# Patient Record
Sex: MALE | Race: OTHER | Employment: UNEMPLOYED | URBAN - METROPOLITAN AREA
[De-identification: names, ages, dates, MRNs, and addresses within clinical notes are randomized per-mention and may not be internally consistent; named-entity substitution may affect disease eponyms.]

---

## 2020-06-02 ENCOUNTER — EDUCATION (OUTPATIENT)
Dept: HEMATOLOGY/ONCOLOGY | Facility: CLINIC | Age: 35
End: 2020-06-02

## 2020-06-02 DIAGNOSIS — Z52.001 DONOR OF STEM CELL: Primary | ICD-10-CM

## 2020-06-16 ENCOUNTER — TELEPHONE (OUTPATIENT)
Dept: HEMATOLOGY/ONCOLOGY | Facility: CLINIC | Age: 35
End: 2020-06-16

## 2020-06-16 NOTE — TELEPHONE ENCOUNTER
----- Message from Kiki Jewell sent at 6/16/2020  3:50 PM CDT -----  Regarding: Lab Order  Contact: PT  PT called - requesting to speak with nurse  PT is confused on where he can go to get lab work done back in his home town in [place]     PT said he's been to 5 different lab facilities and they won't do the certain type of lab work that is required for him to get done.   PT is needing help to find which facilities out there he can get the lab work done.     Callback: [phone]

## 2020-06-17 ENCOUNTER — TELEPHONE (OUTPATIENT)
Dept: HEMATOLOGY/ONCOLOGY | Facility: CLINIC | Age: 35
End: 2020-06-17

## 2020-06-25 PROCEDURE — 81373 HLA I TYPING 1 LOCUS LR: CPT | Mod: 91,PO

## 2020-06-25 PROCEDURE — 81376 HLA II TYPING 1 LOCUS LR: CPT | Mod: 91,PO

## 2020-06-25 PROCEDURE — 81376 HLA II TYPING 1 LOCUS LR: CPT | Mod: 59,PO

## 2020-06-25 PROCEDURE — 81373 HLA I TYPING 1 LOCUS LR: CPT | Mod: PO

## 2020-06-25 PROCEDURE — 81376 HLA II TYPING 1 LOCUS LR: CPT | Mod: PO

## 2020-06-26 ENCOUNTER — LAB VISIT (OUTPATIENT)
Dept: LAB | Facility: HOSPITAL | Age: 35
End: 2020-06-26
Payer: OTHER GOVERNMENT

## 2020-06-26 DIAGNOSIS — Z52.001 DONOR OF STEM CELL: ICD-10-CM

## 2020-06-29 LAB — HLATY INTERPRETATION: NORMAL

## 2020-07-06 LAB
HLA DQA1 1: NORMAL
HLA DQA1 2: NORMAL
HLA DRB4 1: NORMAL
HLA-A 1 SERO. EQUIV: 32
HLA-A 1: NORMAL
HLA-A 2 SERO. EQUIV: 68
HLA-A 2: NORMAL
HLA-B 1 SERO. EQUIV: 65
HLA-B 1: NORMAL
HLA-B 2 SERO. EQUIV: 71
HLA-B 2: NORMAL
HLA-BW 1 SERO. EQUIV: 6
HLA-BW 2 SERO. EQUIV: NORMAL
HLA-C 1: NORMAL
HLA-C 2: NORMAL
HLA-CW 1 SERO. EQUIV: 10
HLA-CW 2 SERO. EQUIV: 8
HLA-DPA1 1: NORMAL
HLA-DPA1 2: NORMAL
HLA-DPB1 1: NORMAL
HLA-DPB1 2: NORMAL
HLA-DQ 1 SERO. EQUIV: 5
HLA-DQ 2 SERO. EQUIV: NORMAL
HLA-DQB1 1: NORMAL
HLA-DQB1 2: NORMAL
HLA-DRB1 1 SERO. EQUIV: 1
HLA-DRB1 1: NORMAL
HLA-DRB1 2 SERO. EQUIV: 10
HLA-DRB1 2: NORMAL
HLA-DRB3 1: NORMAL
HLA-DRB3 2: NORMAL
HLA-DRB345 1 SERO. EQUIV: NORMAL
HLA-DRB345 2 SERO. EQUIV: NORMAL
HLA-DRB4 2: NORMAL
HLA-DRB5 1: NORMAL
HLA-DRB5 2: NORMAL
SSALL INTERPRETATION: NORMAL
SSALL TESTING DATE: NORMAL
SSDPA TESTING DATE: NORMAL
SSDPB TESTING DATE: NORMAL
SSDQA TESTING DATE: NORMAL
SSDQB TESTING DATE: NORMAL
SSDRB TESTING DATE: NORMAL
SSOA TESTING DATE: NORMAL
SSOB TESTING DATE: NORMAL
SSOC TESTING DATE: NORMAL
SSODR TESTING DATE: NORMAL

## 2020-08-06 ENCOUNTER — TELEPHONE (OUTPATIENT)
Dept: HEMATOLOGY/ONCOLOGY | Facility: CLINIC | Age: 35
End: 2020-08-06

## 2020-08-06 NOTE — TELEPHONE ENCOUNTER
Spoke to patient. Informed him that he was chosen to be the donor.  Will call back to schedule eval after speaking with employer.  Will be traveling from [place]

## 2020-08-07 ENCOUNTER — TELEPHONE (OUTPATIENT)
Dept: HEMATOLOGY/ONCOLOGY | Facility: CLINIC | Age: 35
End: 2020-08-07

## 2020-08-07 DIAGNOSIS — Z52.001 DONOR OF STEM CELL: Primary | ICD-10-CM

## 2020-08-07 NOTE — TELEPHONE ENCOUNTER
Spoke to patient.  Informed him that we would like for him to come in at the beginning of sept.  Informed him kit will be sent to him for further testing.  States will have drawn and return.  Very appreciative and happy to be able to help sister

## 2020-08-24 ENCOUNTER — TELEPHONE (OUTPATIENT)
Dept: HEMATOLOGY/ONCOLOGY | Facility: CLINIC | Age: 35
End: 2020-08-24

## 2020-08-26 ENCOUNTER — TELEPHONE (OUTPATIENT)
Dept: HEMATOLOGY/ONCOLOGY | Facility: CLINIC | Age: 35
End: 2020-08-26

## 2020-08-26 DIAGNOSIS — Z52.001 DONOR OF STEM CELL: Primary | ICD-10-CM

## 2020-08-26 PROCEDURE — 81382 HLA II TYPING 1 LOC HR: CPT | Mod: 91,PO

## 2020-08-26 PROCEDURE — 81380 HLA I TYPING 1 LOCUS HR: CPT | Mod: 91,PO

## 2020-08-26 PROCEDURE — 81380 HLA I TYPING 1 LOCUS HR: CPT | Mod: PO

## 2020-08-26 PROCEDURE — 81382 HLA II TYPING 1 LOC HR: CPT | Mod: PO

## 2020-08-26 NOTE — TELEPHONE ENCOUNTER
Spoke to patient.  States he will come in whenever he is needed for consultation, testing, consent, and mobilization and collection.  Would like me to send appt dates and times to his sister.

## 2020-08-31 ENCOUNTER — LAB VISIT (OUTPATIENT)
Dept: LAB | Facility: HOSPITAL | Age: 35
End: 2020-08-31
Payer: OTHER GOVERNMENT

## 2020-08-31 DIAGNOSIS — Z52.001 DONOR OF STEM CELL: ICD-10-CM

## 2020-09-10 LAB
HLA HI RES SEQUNCE BASED TYPING OCH INTERPRETATION: NORMAL
HLA HI RES SEQUNCE BASED TYPING TEST DATE: NORMAL
HLA HR DPA1: NORMAL
HLA HR DPA2: NORMAL
HLA HR DPB1: NORMAL
HLA HR DPB2: NORMAL
HLA HR DQA1: NORMAL
HLA HR DQA2: NORMAL
HLA-A1 HI RES: NORMAL
HLA-A2 HI RES: NORMAL
HLA-B1 HI RES: NORMAL
HLA-B2 HI RES: NORMAL
HLA-C1 HI RES: NORMAL
HLA-C2 HI RES: NORMAL
HLA-DQB1 1 HI RES: NORMAL
HLA-DQB1 2 HI RES: NORMAL
HLA-DRB1 1 HI RES: NORMAL
HLA-DRB1 2 HI RES: NORMAL
HLA-DRB3 1 HI RES: NORMAL
HLA-DRB3 2 HI RES: NORMAL
HLA-DRB4 1 HI RES: NORMAL
HLA-DRB4 2 HI RES: NORMAL
HLA-DRB5 1 HI RES: NORMAL
HLA-DRB5 2 HI RES: NORMAL

## 2020-09-15 ENCOUNTER — HOSPITAL ENCOUNTER (OUTPATIENT)
Dept: RADIOLOGY | Facility: HOSPITAL | Age: 35
Discharge: HOME | End: 2020-09-15
Attending: INTERNAL MEDICINE
Payer: OTHER GOVERNMENT

## 2020-09-15 ENCOUNTER — OFFICE VISIT (OUTPATIENT)
Dept: PSYCHIATRY | Facility: CLINIC | Age: 35
End: 2020-09-15
Payer: OTHER GOVERNMENT

## 2020-09-15 ENCOUNTER — HOSPITAL ENCOUNTER (OUTPATIENT)
Dept: CARDIOLOGY | Facility: CLINIC | Age: 35
Discharge: HOME | End: 2020-09-15
Payer: OTHER GOVERNMENT

## 2020-09-15 VITALS — HEIGHT: 66 IN | WEIGHT: 164.88 LBS | BODY MASS INDEX: 26.5 KG/M2

## 2020-09-15 DIAGNOSIS — Z00.5 TRANSPLANT DONOR EVALUATION: Primary | ICD-10-CM

## 2020-09-15 DIAGNOSIS — Z52.001 STEM CELL DONOR: ICD-10-CM

## 2020-09-15 DIAGNOSIS — Z52.001 DONOR OF STEM CELL: ICD-10-CM

## 2020-09-15 DIAGNOSIS — Z76.82 STEM CELL TRANSPLANT CANDIDATE: ICD-10-CM

## 2020-09-15 PROCEDURE — 71046 X-RAY EXAM CHEST 2 VIEWS: CPT | Mod: 26,,, | Performed by: RADIOLOGY

## 2020-09-15 PROCEDURE — 99212 OFFICE O/P EST SF 10 MIN: CPT | Mod: PBBFAC,25 | Performed by: PSYCHOLOGIST

## 2020-09-15 PROCEDURE — 96136 PSYCL/NRPSYC TST PHY/QHP 1ST: CPT | Mod: ,,, | Performed by: PSYCHOLOGIST

## 2020-09-15 PROCEDURE — 90791 PSYCH DIAGNOSTIC EVALUATION: CPT | Mod: ,,, | Performed by: PSYCHOLOGIST

## 2020-09-15 PROCEDURE — 99999 PR PBB SHADOW E&M-EST. PATIENT-LVL II: CPT | Mod: PBBFAC,,, | Performed by: PSYCHOLOGIST

## 2020-09-15 PROCEDURE — 99999 PR PBB SHADOW E&M-EST. PATIENT-LVL II: ICD-10-PCS | Mod: PBBFAC,,, | Performed by: PSYCHOLOGIST

## 2020-09-15 PROCEDURE — 71046 X-RAY EXAM CHEST 2 VIEWS: CPT | Mod: TC,FY

## 2020-09-15 PROCEDURE — 93005 ELECTROCARDIOGRAM TRACING: CPT | Mod: PBBFAC | Performed by: INTERNAL MEDICINE

## 2020-09-15 PROCEDURE — 93010 ELECTROCARDIOGRAM REPORT: CPT | Mod: S$PBB,,, | Performed by: INTERNAL MEDICINE

## 2020-09-15 PROCEDURE — 90791 PR PSYCHIATRIC DIAGNOSTIC EVALUATION: ICD-10-PCS | Mod: ,,, | Performed by: PSYCHOLOGIST

## 2020-09-15 PROCEDURE — 71046 XR CHEST PA AND LATERAL: ICD-10-PCS | Mod: 26,,, | Performed by: RADIOLOGY

## 2020-09-15 PROCEDURE — 96136 PR PSYCH/NEUROPSYCH TEST ADMIN/SCORING, 2+ TESTS, 1ST 30 MIN: ICD-10-PCS | Mod: ,,, | Performed by: PSYCHOLOGIST

## 2020-09-15 PROCEDURE — 93010 EKG 12-LEAD: ICD-10-PCS | Mod: S$PBB,,, | Performed by: INTERNAL MEDICINE

## 2020-09-15 NOTE — LETTER
September 16, 2020        Franchesca Jackson MD  [address] [facility] [address]             [facility]  [address] [facility] [address] [facility] [address]  Phone: [phone]  Fax: [phone]   Patient: Efe Monge   MR Number: [id number]   YOB: 1985   Date of Visit: 9/15/2020       Dear Dr. Jackson:    Thank you for referring Efe Monge to me for evaluation. Below are the relevant portions of my assessment and plan of care.     Efe Monge is a  35 y.o. male referred by Dr. Jackson for psychological evaluation prior to stem cell donation.   The patient appears absent of disabling psychopathology or disabilities which would prevent understanding and compliance with medical treatment.  There is no evidence of suicidality.   The patient has satisfactory knowledge about stem cell donation, appropriate expectations for health and illness following donation, and adequate  understanding of the possible risks and complications of this procedure.     He does demonstrate some impairment in cognitive functioning, but retains the ability to complete all ADLs and has the ability to consent to donation based upon knowledge and understanding of the procedure.   He reports adequate compliance with previous medical treatment.   Efe Monge has excellent social support.   The patient exhibits a high degree of social stability.   The patient acknowledges no stressors expected to limit his ability to cope with the demands of stem cell donation.   The patient reports limted caffeine consumption, social marijuana use, no tobacco use and limited, social alcohol use   He demonstrates adequate health literacy.        Impressions:  Efe Monge is an acceptable stem cell donation candidate from a psychological perspective. There are no overt psychological contraindications for proceeding with the procedure. He has no significant mental health history, and reports no  current psychiatric problems or major adjustment issues. The patient has reasonable expectations for the procedure, good social support, and has already begun making appropriate life plans in anticipation of the procedure.      If you have questions, please do not hesitate to call me. I look forward to following Efe along with you.    Sincerely,      Yefri Samuels, PhD           CC  Rosenda Torres RN

## 2020-09-16 ENCOUNTER — OFFICE VISIT (OUTPATIENT)
Dept: HEMATOLOGY/ONCOLOGY | Facility: CLINIC | Age: 35
End: 2020-09-16
Payer: OTHER GOVERNMENT

## 2020-09-16 VITALS
TEMPERATURE: 97 F | DIASTOLIC BLOOD PRESSURE: 84 MMHG | SYSTOLIC BLOOD PRESSURE: 140 MMHG | OXYGEN SATURATION: 100 % | HEART RATE: 71 BPM | HEIGHT: 66 IN | WEIGHT: 187.81 LBS | BODY MASS INDEX: 30.18 KG/M2

## 2020-09-16 DIAGNOSIS — Z52.001 STEM CELL DONOR: Primary | ICD-10-CM

## 2020-09-16 PROCEDURE — 99999 PR PBB SHADOW E&M-EST. PATIENT-LVL III: CPT | Mod: PBBFAC,,, | Performed by: INTERNAL MEDICINE

## 2020-09-16 PROCEDURE — 99205 PR OFFICE/OUTPT VISIT, NEW, LEVL V, 60-74 MIN: ICD-10-PCS | Mod: S$PBB,,, | Performed by: INTERNAL MEDICINE

## 2020-09-16 PROCEDURE — 99999 PR PBB SHADOW E&M-EST. PATIENT-LVL III: ICD-10-PCS | Mod: PBBFAC,,, | Performed by: INTERNAL MEDICINE

## 2020-09-16 PROCEDURE — 99205 OFFICE O/P NEW HI 60 MIN: CPT | Mod: S$PBB,,, | Performed by: INTERNAL MEDICINE

## 2020-09-16 PROCEDURE — 99213 OFFICE O/P EST LOW 20 MIN: CPT | Mod: PBBFAC | Performed by: INTERNAL MEDICINE

## 2020-09-16 RX ORDER — ACETAMINOPHEN 325 MG/1
650 TABLET ORAL ONCE AS NEEDED
Status: CANCELLED | OUTPATIENT
Start: 2020-09-19

## 2020-09-16 RX ORDER — DIPHENHYDRAMINE HCL 25 MG
25 CAPSULE ORAL ONCE AS NEEDED
Status: CANCELLED | OUTPATIENT
Start: 2020-09-19

## 2020-09-16 RX ORDER — DIPHENHYDRAMINE HCL 25 MG
25 CAPSULE ORAL ONCE AS NEEDED
Status: CANCELLED | OUTPATIENT
Start: 2020-09-20

## 2020-09-16 RX ORDER — LANOLIN ALCOHOL/MO/W.PET/CERES
800 CREAM (GRAM) TOPICAL ONCE AS NEEDED
Status: CANCELLED | OUTPATIENT
Start: 2020-09-20

## 2020-09-16 RX ORDER — SODIUM,POTASSIUM PHOSPHATES 280-250MG
2 POWDER IN PACKET (EA) ORAL ONCE AS NEEDED
Status: CANCELLED | OUTPATIENT
Start: 2020-09-19

## 2020-09-16 RX ORDER — LANOLIN ALCOHOL/MO/W.PET/CERES
800 CREAM (GRAM) TOPICAL ONCE AS NEEDED
Status: CANCELLED | OUTPATIENT
Start: 2020-09-21

## 2020-09-16 RX ORDER — POTASSIUM CHLORIDE 750 MG/1
40 TABLET, EXTENDED RELEASE ORAL ONCE AS NEEDED
Status: CANCELLED | OUTPATIENT
Start: 2020-09-19

## 2020-09-16 RX ORDER — ACETAMINOPHEN 325 MG/1
650 TABLET ORAL ONCE AS NEEDED
Status: CANCELLED | OUTPATIENT
Start: 2020-09-17

## 2020-09-16 RX ORDER — ACETAMINOPHEN 325 MG/1
650 TABLET ORAL ONCE AS NEEDED
Status: CANCELLED | OUTPATIENT
Start: 2020-09-20

## 2020-09-16 RX ORDER — LANOLIN ALCOHOL/MO/W.PET/CERES
800 CREAM (GRAM) TOPICAL ONCE AS NEEDED
Status: CANCELLED | OUTPATIENT
Start: 2020-09-19

## 2020-09-16 RX ORDER — DIPHENHYDRAMINE HCL 25 MG
25 CAPSULE ORAL ONCE AS NEEDED
Status: CANCELLED | OUTPATIENT
Start: 2020-09-18

## 2020-09-16 RX ORDER — DIPHENHYDRAMINE HCL 25 MG
25 CAPSULE ORAL ONCE AS NEEDED
Status: CANCELLED | OUTPATIENT
Start: 2020-09-17

## 2020-09-16 RX ORDER — POTASSIUM CHLORIDE 750 MG/1
40 TABLET, EXTENDED RELEASE ORAL ONCE AS NEEDED
Status: CANCELLED | OUTPATIENT
Start: 2020-09-21

## 2020-09-16 RX ORDER — SODIUM,POTASSIUM PHOSPHATES 280-250MG
2 POWDER IN PACKET (EA) ORAL ONCE AS NEEDED
Status: CANCELLED | OUTPATIENT
Start: 2020-09-18

## 2020-09-16 RX ORDER — DIPHENHYDRAMINE HCL 25 MG
25 CAPSULE ORAL ONCE AS NEEDED
Status: CANCELLED | OUTPATIENT
Start: 2020-09-21

## 2020-09-16 RX ORDER — ACETAMINOPHEN 325 MG/1
650 TABLET ORAL ONCE AS NEEDED
Status: CANCELLED | OUTPATIENT
Start: 2020-09-18

## 2020-09-16 RX ORDER — POTASSIUM CHLORIDE 750 MG/1
40 TABLET, EXTENDED RELEASE ORAL ONCE AS NEEDED
Status: CANCELLED | OUTPATIENT
Start: 2020-09-22

## 2020-09-16 RX ORDER — SODIUM,POTASSIUM PHOSPHATES 280-250MG
2 POWDER IN PACKET (EA) ORAL ONCE AS NEEDED
Status: CANCELLED | OUTPATIENT
Start: 2020-09-21

## 2020-09-16 RX ORDER — LANOLIN ALCOHOL/MO/W.PET/CERES
800 CREAM (GRAM) TOPICAL ONCE AS NEEDED
Status: CANCELLED | OUTPATIENT
Start: 2020-09-18

## 2020-09-16 RX ORDER — ACETAMINOPHEN 325 MG/1
650 TABLET ORAL ONCE AS NEEDED
Status: CANCELLED | OUTPATIENT
Start: 2020-09-22

## 2020-09-16 RX ORDER — SODIUM,POTASSIUM PHOSPHATES 280-250MG
2 POWDER IN PACKET (EA) ORAL ONCE AS NEEDED
Status: CANCELLED | OUTPATIENT
Start: 2020-09-17

## 2020-09-16 RX ORDER — POTASSIUM CHLORIDE 750 MG/1
40 TABLET, EXTENDED RELEASE ORAL ONCE AS NEEDED
Status: CANCELLED | OUTPATIENT
Start: 2020-09-17

## 2020-09-16 RX ORDER — LANOLIN ALCOHOL/MO/W.PET/CERES
800 CREAM (GRAM) TOPICAL ONCE AS NEEDED
Status: CANCELLED | OUTPATIENT
Start: 2020-09-22

## 2020-09-16 RX ORDER — DIPHENHYDRAMINE HCL 25 MG
25 CAPSULE ORAL ONCE AS NEEDED
Status: CANCELLED | OUTPATIENT
Start: 2020-09-22

## 2020-09-16 RX ORDER — POTASSIUM CHLORIDE 750 MG/1
40 TABLET, EXTENDED RELEASE ORAL ONCE AS NEEDED
Status: CANCELLED | OUTPATIENT
Start: 2020-09-18

## 2020-09-16 RX ORDER — SODIUM,POTASSIUM PHOSPHATES 280-250MG
2 POWDER IN PACKET (EA) ORAL ONCE AS NEEDED
Status: CANCELLED | OUTPATIENT
Start: 2020-09-20

## 2020-09-16 RX ORDER — LANOLIN ALCOHOL/MO/W.PET/CERES
800 CREAM (GRAM) TOPICAL ONCE AS NEEDED
Status: CANCELLED | OUTPATIENT
Start: 2020-09-17

## 2020-09-16 RX ORDER — SODIUM,POTASSIUM PHOSPHATES 280-250MG
2 POWDER IN PACKET (EA) ORAL ONCE AS NEEDED
Status: CANCELLED | OUTPATIENT
Start: 2020-09-22

## 2020-09-16 RX ORDER — POTASSIUM CHLORIDE 750 MG/1
40 TABLET, EXTENDED RELEASE ORAL ONCE AS NEEDED
Status: CANCELLED | OUTPATIENT
Start: 2020-09-20

## 2020-09-16 RX ORDER — ACETAMINOPHEN 325 MG/1
650 TABLET ORAL ONCE AS NEEDED
Status: CANCELLED | OUTPATIENT
Start: 2020-09-21

## 2020-09-16 NOTE — PROGRESS NOTES
Psycho-Oncology Stem Cell Donor Evaluation  Psychiatry Initial Visit (PhD)    Date:  9/15/2020     CPT Code: 84122, 43392 Evaluation Length (direct face-to-face time):  1 hour    INFORMED CONSENT/ LIMITS of CONFIDENTIALITY: Prior to beginning the interview, the patient's identification was confirmed via name and date of birth.The patient was informed of the possible risks and benefits of psychological interventions (e.g., counseling, psychotherapy, testing) and provided information regarding the handling of protected health records and the limits of confidentiality, including the importance of reporting any suicidal or homicidal ideation to ensure safety of all parties. This provider explained the purpose of today's appointment and the patient was provided with time to ask questions regarding this information.  Acceptance and understanding of these conditions was expressed, and Efe Monge freely consented to this evaluation.       Referred by:  BMT Team/ Oncologist: KEL Jackson MD.     Chief complaint/reason for encounter:  Psychological Evaluation prior to stem cell donation    Clinical status of patient: Outpatient    Efe Monge, a 35 y.o. male, was seen for pre-stem cell donation evaluation.  Met with patient. His primary care physician is [facility].         Medical/Surgical History:   Patient Active Problem List   Diagnosis    Stem cell transplant candidate    Stem cell donor        Health Behaviors:       ETOH Use: Yes (social and within NIAAA healthy use limits for age and gender)      Tobacco Use: No   Illicit Drug Use:  Yes  (marijuana 2x/week)   Prescription Misuse:No   Caffeine: minimal   Exercise:Patient has a very physical job 7 days/week).     Family History:   Psychiatric illness: Yes- mother depression     Alcohol/Drug Abuse: No     Suicide: No      Past Psychiatric History:   Inpatient treatment: No     Outpatient treatment: Yes (family therapy in childhood)    Prior substance abuse  treatment: No     Suicide Attempts: No      Psychotropic Medications:  Current: none       Past: none    Current medications as per below, allergies reviewed in chart.  No current outpatient medications on file.     No current facility-administered medications for this visit.         CAM Therapies:     Social situation/Stressors:Efe Monge is an 35 y.o. male referred by KEL Jackson MD for pre-stem cell donation evaluation.  Efe Monge lives with his wife (Lana) in [place], [place]. He is a full-time worker on a horse ranch.  He has been in his job for 1 year.  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  The patient reports that he does have adequate availability of time off for the procedure and recovery.  Efe Monge has been  1x and has no children. The patient reports good social support from his wife and her family. He is also very close to his sister (to whom he is donating stem cells). His wife will be present and available to assist the patient during his recovery period.   Efe Monge is not Ep[employer]. Efe Monge's hobbies include playing [employer] and video games, being outside in nature.   The patient has no  history.    Additional stressors: none reported     Strengths: Steady employment, financial stability, Housing stability, Able to vocalize needs, Setting and pursuing goals, hopes, dreams, aspirations and Interpersonal relationships and supports available - family, relatives, friends   Liabilities:     History of present illness: Patient is a prospective stem cell donor for his sister (Danitza Varela). Efe Monge has adjusted to his sister's illness well primarily through active coping strategies and focus on his sister. He has engaged in limited independent information gathering.  The patient has excellent family support. The patient reports the following barriers to stem cell donation: none (work is supportive and  "his MIL is his supervisor). Efe Monge reports using music and time with family and friends  as his primary methods of coping with general stressors.  Donation-related psychosocial stressors include absence from work. These stressors will not prevent patient from adhering to post-procedural guidelines.     Stem Cell Donation:  Efe Monge possesses a satisfactory level of knowledge about stem cell donation gleaned from discussions and materials provided by program personnel .  Efe Monge is knowledgeable about the possible costs, risks, and complications of the procedure and the behavioral changes which will be required of him.  He has anticipated his recovery needs and has planned adequate time away from work and assistance from his wife to facilitate healing. The patient reports adequate compliance with medical treatment in the past, which is supported by review of his medical chart. He has been focusing more on his health this year (since his sister's diagnosis;"I want to have a good long life.").  Efe Monge has realistic expectations of health and illness possibilities following stem cell donation. He is aware that medical side effects are rare. He is aware of possible medical side effects including medication side effects, discomfort, bruising, and fatigue during/after apheresis, and pain or infection during/following central line placement. He also anticipates potential psychological discomfort/sadness if his sister's clinical course is not positive.     Psychological Screening:   Distress thermometer:   Distress Score    Distress Score: 0        Practical Problems Physical Problems                                                   Family Problems                                         Emotional Problems                                                         Spiritual/Religions Concerns               Other Problems              · Mood: denied;  prior depression: after a romantic breakup in his 20's and " no SI/HI; PHQ-9=0; does not meet screener  · Yolanda: Denies   · Psychosis: Denies  · Anxiety: denied; no prior;  GURWINDER-7=0; does not meet screener  · Generalized anxiety: Denies       · Panic Disorder: Denies  · Social/specific phobia: Denies   · OCD: Denies  · Substance abuse: denied  · Cognitive functioning: denied -but see SLUMS  · Health behaviors: noncontributory  · Sleep:  restful sleep  and no concerns    · Trauma: Denies  · Head Injury History: Denies  · Personality Functioning: The patient does not display any personality   characteristics which would be an impediment to receiving BMT.      Mental Status Exam:    General appearance:  appears stated age, neatly dressed, well groomed  Level of cooperation:  cooperative  Thought processes:  logical, goal-directed   Speech: normal in rate, volume, and tone    Mood: euthymic  Affect: mood congruent, full range and appropriate  Thought content:  no illusions, no visual hallucinations, no auditory hallucinations, no delusions, no active or passive homicidal thoughts, no active or passive suicidal ideation, no obsessions, no compulsions, no violence  Orientation:  oriented to person, place, and time  Memory:  Recent memory:  3 of 5 objects after brief delay.    Remote memory - intact  Attention span and concentration:  spelled WORLD forwards and backwards; SAVEAHAART with one recognized intrusion  Abstract reasoning:    Similarities: abstract.    Proverbs: abstract.  Judgment and insight: fair  Language:  Intact    SLUMS:  The [employer] Mental Status Examination (UMS), a cognitive screener, was administered to assess the patient's current mental status and cognitive capacity. Patient obtained a score of 22/30. This score does not fall within normal limits as compared to those within similar age and level of education, and suggests mild impairment in neurocognitive functioning. (Score may be impacted by historical heavy marijuana use.)    REALM-R:   Sufficient health literacy    PCS:   Pain Catastrophizing Scale: (Total=0; Rumination=0; Magnification= 0; Helplessness=  0) . Results suggest adequate pain coping.    SUMMARY AND RECOMMENDATIONS:   Efe Monge is a  35 y.o. male referred by Dr. Jackson for psychological evaluation prior to stem cell donation.   The patient appears absent of disabling psychopathology or disabilities which would prevent understanding and compliance with medical treatment.  There is no evidence of suicidality.   The patient has satisfactory knowledge about stem cell donation, appropriate expectations for health and illness following donation, and adequate  understanding of the possible risks and complications of this procedure.     He does demonstrate some impairment in cognitive functioning, but retains the ability to complete all ADLs and has the ability to consent to donation based upon knowledge and understanding of the procedure.   He reports adequate compliance with previous medical treatment.   Efe Monge has excellent social support.   The patient exhibits a high degree of social stability.   The patient acknowledges no stressors expected to limit his ability to cope with the demands of stem cell donation.   The patient reports limted caffeine consumption, social marijuana use, no tobacco use and limited, social alcohol use   He demonstrates adequate health literacy.        Impressions:  Efe Monge is an acceptable stem cell donation candidate from a psychological perspective. There are no overt psychological contraindications for proceeding with the procedure. He has no significant mental health history, and reports no current psychiatric problems or major adjustment issues. The patient has reasonable expectations for the procedure, good social support, and has already begun making appropriate life plans in anticipation of the procedure.       Assessment - Diagnosis - Goals:     1. Stem cell transplant candidate     2. Stem cell donor         Yefri Samuels, PhD  Clinical Psychologist  [place] License #[id number]

## 2020-09-17 ENCOUNTER — INFUSION (OUTPATIENT)
Dept: INFUSION THERAPY | Facility: HOSPITAL | Age: 35
End: 2020-09-17
Attending: INTERNAL MEDICINE
Payer: OTHER GOVERNMENT

## 2020-09-17 DIAGNOSIS — Z76.82 STEM CELL TRANSPLANT CANDIDATE: Primary | ICD-10-CM

## 2020-09-17 PROCEDURE — 63600175 PHARM REV CODE 636 W HCPCS: Mod: JG | Performed by: NURSE PRACTITIONER

## 2020-09-17 PROCEDURE — 96372 THER/PROPH/DIAG INJ SC/IM: CPT

## 2020-09-17 RX ADMIN — FILGRASTIM 780 MCG: 480 INJECTION, SOLUTION INTRAVENOUS; SUBCUTANEOUS at 08:09

## 2020-09-17 NOTE — PROGRESS NOTES
Hematology and Medical Oncology   New Patient Consult     09/16/2020    Reason For Referral: Allogenic Stem Cell Donor Candidate    History of Present Ilness:   Efe Monge (Efe) is a pleasant 35 y.o.male who presents to clinic to discuss the process of stem cell donation. He has flown from [place] for evaluation and donation. He has no personal medical history. He currently works on a horse farm. Does endorse consuming alcohol and weekly marijuana use, but nothing in excess.     He has no reservation about being a donor. He is happy and eager to help his sister. No active issues or complaints.    PAST MEDICAL HISTORY:   Past Medical History:   Diagnosis Date    Therapy     family therapy in childhood       PAST SURGICAL HISTORY:   No past surgical history on file.    PAST SOCIAL HISTORY:   reports that he has never smoked. He does not have any smokeless tobacco history on file. He reports current alcohol use of about 2.0 standard drinks of alcohol per week. He reports current drug use. Frequency: 2.00 times per week. Drug: Marijuana.    FAMILY HISTORY:  Family History   Problem Relation Age of Onset    Depression Mother        CURRENT MEDICATIONS:   No current outpatient medications on file.     No current facility-administered medications for this visit.      ALLERGIES:   Review of patient's allergies indicates:  No Known Allergies      Review of Systems:     Review of Systems   Constitutional: Negative for appetite change, chills, diaphoresis, fatigue, fever and unexpected weight change.   HENT:   Negative for hearing loss, mouth sores, nosebleeds, sore throat, trouble swallowing and voice change.    Eyes: Negative for eye problems and icterus.   Respiratory: Negative for chest tightness, cough, hemoptysis, shortness of breath and wheezing.    Cardiovascular: Negative for chest pain, leg swelling and palpitations.   Gastrointestinal: Negative for abdominal distention, abdominal pain, blood in stool,  diarrhea, nausea and vomiting.   Endocrine: Negative for hot flashes.   Genitourinary: Negative for bladder incontinence, difficulty urinating, dysuria and hematuria.    Musculoskeletal: Negative for arthralgias, back pain, flank pain, gait problem, myalgias, neck pain and neck stiffness.   Skin: Negative for itching, rash and wound.   Neurological: Negative for dizziness, extremity weakness, gait problem, headaches, numbness, seizures and speech difficulty.   Hematological: Negative for adenopathy. Does not bruise/bleed easily.   Psychiatric/Behavioral: Negative for confusion, depression and sleep disturbance. The patient is not nervous/anxious.           Physical Exam:     Vitals:    09/16/20 0915   BP: (!) 140/84   Pulse: 71   Temp: 97.4 °F (36.3 °C)     Physical Exam  Constitutional:       General: He is not in acute distress.     Appearance: He is well-developed. He is not diaphoretic.   HENT:      Head: Normocephalic and atraumatic.      Mouth/Throat:      Pharynx: No oropharyngeal exudate.   Eyes:      Conjunctiva/sclera: Conjunctivae normal.      Pupils: Pupils are equal, round, and reactive to light.   Neck:      Musculoskeletal: Normal range of motion and neck supple.      Thyroid: No thyromegaly.      Vascular: No JVD.      Trachea: No tracheal deviation.   Cardiovascular:      Rate and Rhythm: Normal rate and regular rhythm.      Heart sounds: Normal heart sounds. No murmur. No friction rub.   Pulmonary:      Effort: Pulmonary effort is normal. No respiratory distress.      Breath sounds: Normal breath sounds. No stridor. No wheezing or rales.   Chest:      Chest wall: No tenderness.   Abdominal:      General: Bowel sounds are normal. There is no distension.      Palpations: Abdomen is soft.      Tenderness: There is no abdominal tenderness. There is no guarding or rebound.   Musculoskeletal: Normal range of motion.         General: No tenderness or deformity.   Skin:     General: Skin is warm and dry.       Capillary Refill: Capillary refill takes less than 2 seconds.      Coloration: Skin is not pale.      Findings: No erythema or rash.   Neurological:      Mental Status: He is alert and oriented to person, place, and time.      Cranial Nerves: No cranial nerve deficit.      Sensory: No sensory deficit.      Motor: No abnormal muscle tone.      Coordination: Coordination normal.      Deep Tendon Reflexes: Reflexes normal.   Psychiatric:         Behavior: Behavior normal.         Thought Content: Thought content normal.         Judgment: Judgment normal.         ECOG Performance Status: (foot note - ECOG PS provided by Eastern Cooperative Oncology Group) 0 - Asymptomatic    Karnofsky Performance Score:  100%- Normal, No Complaints, No Evidence of Disease    Labs:   Lab Results   Component Value Date    WBC 8.66 09/15/2020    HGB 14.9 09/15/2020    HCT 44.9 09/15/2020     09/15/2020    ALT 21 09/15/2020    AST 20 09/15/2020     09/15/2020    K 3.8 09/15/2020     09/15/2020    CREATININE 1.0 09/15/2020    BUN 21 (H) 09/15/2020    CO2 31 (H) 09/15/2020    INR 0.9 09/15/2020       Imaging: Previous imaging has been reviewed   Assessment and Plan:     Mr. Monge is a 35 year old male who presents to be a stem cell donor    Peripheral Stem Cell Donor  --Discussed transplant and risk including stem cell mobilization with GCSF and mozobil and risk of fever, arthralgias, and abdominal pain. Discussed central line placement and risk of bleeding or pain. Discussed stem cell collection and risk of hypotension, electrolyte changes, and cytopenias. Discussed storage and DMSO. We completed the chemotherapy consent, transfusion consent, storage agreement, and CIBMTR research consents. Patient will now move forward with evaluation  --Work up to date has been within normal limits  --Presented to transplant evaluation committee today  --Neupogen shots are slated to start in the next several days  --He knows to  contact us if bone discomfort is significant during the mobilization process        60 minutes were spent face to face with the patient and his  family to discuss the process. I have provided the patient with an opportunity to ask questions and have all questions answered to his satisfaction.       he will return to clinic as scheduled by the coordinators for mobilization, but knows to call in the interim if symptoms change or should a problem arise.        Franchesca Jackson MD  Hematology and Medical Oncology  Bone Marrow Transplant  [facility]

## 2020-09-17 NOTE — NURSING
Patient received Neupogen injection S! To R. ABD.  Tolerated well. Information printed and given to patient prior to administration. All questions answered.  RTC tomorrow for D2.

## 2020-09-18 ENCOUNTER — INFUSION (OUTPATIENT)
Dept: INFUSION THERAPY | Facility: HOSPITAL | Age: 35
End: 2020-09-18
Attending: INTERNAL MEDICINE
Payer: OTHER GOVERNMENT

## 2020-09-18 DIAGNOSIS — Z76.82 STEM CELL TRANSPLANT CANDIDATE: Primary | ICD-10-CM

## 2020-09-18 PROCEDURE — 96372 THER/PROPH/DIAG INJ SC/IM: CPT

## 2020-09-18 PROCEDURE — 63600175 PHARM REV CODE 636 W HCPCS: Mod: JG | Performed by: NURSE PRACTITIONER

## 2020-09-18 RX ADMIN — FILGRASTIM 780 MCG: 480 INJECTION, SOLUTION INTRAVENOUS; SUBCUTANEOUS at 08:09

## 2020-09-19 ENCOUNTER — INFUSION (OUTPATIENT)
Dept: INFUSION THERAPY | Facility: HOSPITAL | Age: 35
End: 2020-09-19
Attending: INTERNAL MEDICINE
Payer: OTHER GOVERNMENT

## 2020-09-19 VITALS
SYSTOLIC BLOOD PRESSURE: 120 MMHG | TEMPERATURE: 98 F | DIASTOLIC BLOOD PRESSURE: 81 MMHG | HEART RATE: 86 BPM | RESPIRATION RATE: 18 BRPM

## 2020-09-19 DIAGNOSIS — Z76.82 STEM CELL TRANSPLANT CANDIDATE: Primary | ICD-10-CM

## 2020-09-19 PROCEDURE — 63600175 PHARM REV CODE 636 W HCPCS: Mod: JG | Performed by: NURSE PRACTITIONER

## 2020-09-19 PROCEDURE — 96372 THER/PROPH/DIAG INJ SC/IM: CPT

## 2020-09-19 RX ADMIN — FILGRASTIM 780 MCG: 480 INJECTION, SOLUTION INTRAVENOUS; SUBCUTANEOUS at 08:09

## 2020-09-19 NOTE — NURSING
Arrives to clinic ambulatory with family member. No complaints other than bone pain taking Claritin OTC providing moderate relief. Will begin to take OTC tylenol 500mg if ok with transplant physician. neupogen administered sq to the abdomen tolerated well. D/c ambulatory no distress vitals stable. Scheduled to rtn 9/20/20 at 8 am

## 2020-09-20 ENCOUNTER — INFUSION (OUTPATIENT)
Dept: INFUSION THERAPY | Facility: HOSPITAL | Age: 35
End: 2020-09-20
Attending: OPHTHALMOLOGY
Payer: OTHER GOVERNMENT

## 2020-09-20 DIAGNOSIS — Z76.82 STEM CELL TRANSPLANT CANDIDATE: Primary | ICD-10-CM

## 2020-09-20 PROCEDURE — 63600175 PHARM REV CODE 636 W HCPCS: Mod: JG | Performed by: NURSE PRACTITIONER

## 2020-09-20 PROCEDURE — 96372 THER/PROPH/DIAG INJ SC/IM: CPT

## 2020-09-20 RX ADMIN — FILGRASTIM 780 MCG: 480 INJECTION, SOLUTION INTRAVENOUS; SUBCUTANEOUS at 08:09

## 2020-09-20 NOTE — NURSING
Arrived for neupogen injection. Denies any complaints. Tolerated well. DC home to return in morning for next injection and collection.

## 2020-09-21 ENCOUNTER — INFUSION (OUTPATIENT)
Dept: INFUSION THERAPY | Facility: HOSPITAL | Age: 35
End: 2020-09-21
Attending: INTERNAL MEDICINE
Payer: OTHER GOVERNMENT

## 2020-09-21 ENCOUNTER — HOSPITAL ENCOUNTER (OUTPATIENT)
Dept: TRANSFUSION MEDICINE | Facility: HOSPITAL | Age: 35
Discharge: HOME | End: 2020-09-21
Attending: INTERNAL MEDICINE
Payer: OTHER GOVERNMENT

## 2020-09-21 VITALS
BODY MASS INDEX: 30.05 KG/M2 | DIASTOLIC BLOOD PRESSURE: 75 MMHG | SYSTOLIC BLOOD PRESSURE: 140 MMHG | HEIGHT: 66 IN | HEART RATE: 93 BPM | TEMPERATURE: 98 F | WEIGHT: 187 LBS

## 2020-09-21 VITALS — BODY MASS INDEX: 31.36 KG/M2 | HEIGHT: 66 IN | WEIGHT: 195.13 LBS

## 2020-09-21 DIAGNOSIS — Z52.001 STEM CELL DONOR: Primary | ICD-10-CM

## 2020-09-21 DIAGNOSIS — Z76.82 STEM CELL TRANSPLANT CANDIDATE: Primary | ICD-10-CM

## 2020-09-21 LAB
ABO + RH BLD: NORMAL
ALBUMIN SERPL BCP-MCNC: 3.3 G/DL (ref 3.5–5.2)
ALBUMIN SERPL BCP-MCNC: 4.3 G/DL (ref 3.5–5.2)
ALP SERPL-CCNC: 171 U/L (ref 55–135)
ALP SERPL-CCNC: 244 U/L (ref 55–135)
ALT SERPL W/O P-5'-P-CCNC: 46 U/L (ref 10–44)
ALT SERPL W/O P-5'-P-CCNC: 57 U/L (ref 10–44)
ANION GAP SERPL CALC-SCNC: 11 MMOL/L (ref 8–16)
ANION GAP SERPL CALC-SCNC: 8 MMOL/L (ref 8–16)
APTT BLDCRRT: 21.4 SEC (ref 21–32)
AST SERPL-CCNC: 27 U/L (ref 10–40)
AST SERPL-CCNC: 36 U/L (ref 10–40)
BASOPHILS # BLD AUTO: ABNORMAL K/UL (ref 0–0.2)
BASOPHILS # BLD AUTO: ABNORMAL K/UL (ref 0–0.2)
BASOPHILS NFR BLD: 0 % (ref 0–1.9)
BASOPHILS NFR BLD: 0 % (ref 0–1.9)
BILIRUB SERPL-MCNC: 0.2 MG/DL (ref 0.1–1)
BILIRUB SERPL-MCNC: 0.2 MG/DL (ref 0.1–1)
BLD GP AB SCN CELLS X3 SERPL QL: NORMAL
BUN SERPL-MCNC: 18 MG/DL (ref 6–20)
BUN SERPL-MCNC: 21 MG/DL (ref 6–20)
CA-I BLDV-SCNC: 1.31 MMOL/L (ref 1.06–1.42)
CA-I BLDV-SCNC: 1.34 MMOL/L (ref 1.06–1.42)
CALCIUM SERPL-MCNC: 8.9 MG/DL (ref 8.7–10.5)
CALCIUM SERPL-MCNC: 9.6 MG/DL (ref 8.7–10.5)
CD34 %: 0.34 %
CD34 ABSOLUTE: 182.37 CELLS/UL
CD34 VIABILITY: 98.4 %
CHLORIDE SERPL-SCNC: 103 MMOL/L (ref 95–110)
CHLORIDE SERPL-SCNC: 103 MMOL/L (ref 95–110)
CO2 SERPL-SCNC: 27 MMOL/L (ref 23–29)
CO2 SERPL-SCNC: 29 MMOL/L (ref 23–29)
CREAT SERPL-MCNC: 0.7 MG/DL (ref 0.5–1.4)
CREAT SERPL-MCNC: 0.8 MG/DL (ref 0.5–1.4)
DIFFERENTIAL METHOD: ABNORMAL
DIFFERENTIAL METHOD: ABNORMAL
DOHLE BOD BLD QL SMEAR: PRESENT
DOHLE BOD BLD QL SMEAR: PRESENT
EOSINOPHIL # BLD AUTO: ABNORMAL K/UL (ref 0–0.5)
EOSINOPHIL # BLD AUTO: ABNORMAL K/UL (ref 0–0.5)
EOSINOPHIL NFR BLD: 3 % (ref 0–8)
EOSINOPHIL NFR BLD: 4 % (ref 0–8)
ERYTHROCYTE [DISTWIDTH] IN BLOOD BY AUTOMATED COUNT: 12.4 % (ref 11.5–14.5)
ERYTHROCYTE [DISTWIDTH] IN BLOOD BY AUTOMATED COUNT: 12.5 % (ref 11.5–14.5)
EST. GFR  (AFRICAN AMERICAN): >60 ML/MIN/1.73 M^2
EST. GFR  (AFRICAN AMERICAN): >60 ML/MIN/1.73 M^2
EST. GFR  (NON AFRICAN AMERICAN): >60 ML/MIN/1.73 M^2
EST. GFR  (NON AFRICAN AMERICAN): >60 ML/MIN/1.73 M^2
GLUCOSE SERPL-MCNC: 71 MG/DL (ref 70–110)
GLUCOSE SERPL-MCNC: 79 MG/DL (ref 70–110)
HCT VFR BLD AUTO: 38.3 % (ref 40–54)
HCT VFR BLD AUTO: 40.4 % (ref 40–54)
HGB BLD-MCNC: 12.3 G/DL (ref 14–18)
HGB BLD-MCNC: 13 G/DL (ref 14–18)
IMM GRANULOCYTES # BLD AUTO: ABNORMAL K/UL (ref 0–0.04)
IMM GRANULOCYTES # BLD AUTO: ABNORMAL K/UL (ref 0–0.04)
IMM GRANULOCYTES NFR BLD AUTO: ABNORMAL % (ref 0–0.5)
IMM GRANULOCYTES NFR BLD AUTO: ABNORMAL % (ref 0–0.5)
INR PPP: 0.9 (ref 0.8–1.2)
LYMPHOCYTES # BLD AUTO: ABNORMAL K/UL (ref 1–4.8)
LYMPHOCYTES # BLD AUTO: ABNORMAL K/UL (ref 1–4.8)
LYMPHOCYTES NFR BLD: 7 % (ref 18–48)
LYMPHOCYTES NFR BLD: 8 % (ref 18–48)
MAGNESIUM SERPL-MCNC: 1.5 MG/DL (ref 1.6–2.6)
MAGNESIUM SERPL-MCNC: 1.8 MG/DL (ref 1.6–2.6)
MCH RBC QN AUTO: 30.4 PG (ref 27–31)
MCH RBC QN AUTO: 31 PG (ref 27–31)
MCHC RBC AUTO-ENTMCNC: 32.1 G/DL (ref 32–36)
MCHC RBC AUTO-ENTMCNC: 32.2 G/DL (ref 32–36)
MCV RBC AUTO: 95 FL (ref 82–98)
MCV RBC AUTO: 96 FL (ref 82–98)
METAMYELOCYTES NFR BLD MANUAL: 1 %
METAMYELOCYTES NFR BLD MANUAL: 1 %
MONOCYTES # BLD AUTO: ABNORMAL K/UL (ref 0.3–1)
MONOCYTES # BLD AUTO: ABNORMAL K/UL (ref 0.3–1)
MONOCYTES NFR BLD: 10 % (ref 4–15)
MONOCYTES NFR BLD: 7 % (ref 4–15)
MYELOCYTES NFR BLD MANUAL: 4 %
NEUTROPHILS NFR BLD: 63 % (ref 38–73)
NEUTROPHILS NFR BLD: 69 % (ref 38–73)
NEUTS BAND NFR BLD MANUAL: 11 %
NEUTS BAND NFR BLD MANUAL: 12 %
NRBC BLD-RTO: 0 /100 WBC
NRBC BLD-RTO: 0 /100 WBC
PHOSPHATE SERPL-MCNC: 3 MG/DL (ref 2.7–4.5)
PHOSPHATE SERPL-MCNC: 3.5 MG/DL (ref 2.7–4.5)
PLATELET # BLD AUTO: 195 K/UL (ref 150–350)
PLATELET # BLD AUTO: 279 K/UL (ref 150–350)
PLATELET BLD QL SMEAR: ABNORMAL
PLATELET BLD QL SMEAR: ABNORMAL
PMV BLD AUTO: 11.2 FL (ref 9.2–12.9)
PMV BLD AUTO: 9.8 FL (ref 9.2–12.9)
POTASSIUM SERPL-SCNC: 3.4 MMOL/L (ref 3.5–5.1)
POTASSIUM SERPL-SCNC: 3.5 MMOL/L (ref 3.5–5.1)
PROT SERPL-MCNC: 6.3 G/DL (ref 6–8.4)
PROT SERPL-MCNC: 8 G/DL (ref 6–8.4)
PROTHROMBIN TIME: 9.8 SEC (ref 9–12.5)
RBC # BLD AUTO: 4.04 M/UL (ref 4.6–6.2)
RBC # BLD AUTO: 4.2 M/UL (ref 4.6–6.2)
SODIUM SERPL-SCNC: 140 MMOL/L (ref 136–145)
SODIUM SERPL-SCNC: 141 MMOL/L (ref 136–145)
TOXIC GRANULES BLD QL SMEAR: PRESENT
TOXIC GRANULES BLD QL SMEAR: PRESENT
WBC # BLD AUTO: 36.35 K/UL (ref 3.9–12.7)
WBC # BLD AUTO: 48.75 K/UL (ref 3.9–12.7)

## 2020-09-21 PROCEDURE — 83735 ASSAY OF MAGNESIUM: CPT | Mod: 91

## 2020-09-21 PROCEDURE — 86367 STEM CELLS TOTAL COUNT: CPT

## 2020-09-21 PROCEDURE — 63600175 PHARM REV CODE 636 W HCPCS: Performed by: PATHOLOGY

## 2020-09-21 PROCEDURE — 63600175 PHARM REV CODE 636 W HCPCS: Mod: JG | Performed by: NURSE PRACTITIONER

## 2020-09-21 PROCEDURE — 84100 ASSAY OF PHOSPHORUS: CPT

## 2020-09-21 PROCEDURE — 38205 PR PROG CELL HARVEST,TRANSPLANT,ALLOGENIC: ICD-10-PCS | Mod: ,,, | Performed by: PATHOLOGY

## 2020-09-21 PROCEDURE — 38205 HARVEST ALLOGENEIC STEM CELL: CPT | Mod: ,,, | Performed by: PATHOLOGY

## 2020-09-21 PROCEDURE — 85007 BL SMEAR W/DIFF WBC COUNT: CPT

## 2020-09-21 PROCEDURE — 80053 COMPREHEN METABOLIC PANEL: CPT

## 2020-09-21 PROCEDURE — 25000003 PHARM REV CODE 250: Performed by: NURSE PRACTITIONER

## 2020-09-21 PROCEDURE — 25000003 PHARM REV CODE 250: Performed by: PATHOLOGY

## 2020-09-21 PROCEDURE — 85730 THROMBOPLASTIN TIME PARTIAL: CPT

## 2020-09-21 PROCEDURE — 86850 RBC ANTIBODY SCREEN: CPT

## 2020-09-21 PROCEDURE — 96372 THER/PROPH/DIAG INJ SC/IM: CPT

## 2020-09-21 PROCEDURE — 38207 CRYOPRESERVE STEM CELLS: CPT

## 2020-09-21 PROCEDURE — 38205 HARVEST ALLOGENEIC STEM CELL: CPT

## 2020-09-21 PROCEDURE — 38214 VOLUME DEPLETE OF HARVEST: CPT

## 2020-09-21 PROCEDURE — 82330 ASSAY OF CALCIUM: CPT | Mod: 91

## 2020-09-21 PROCEDURE — 85610 PROTHROMBIN TIME: CPT

## 2020-09-21 PROCEDURE — 85027 COMPLETE CBC AUTOMATED: CPT | Mod: 91

## 2020-09-21 RX ORDER — POTASSIUM CHLORIDE 20 MEQ/1
40 TABLET, EXTENDED RELEASE ORAL ONCE AS NEEDED
Status: COMPLETED | OUTPATIENT
Start: 2020-09-21 | End: 2020-09-21

## 2020-09-21 RX ORDER — SODIUM,POTASSIUM PHOSPHATES 280-250MG
2 POWDER IN PACKET (EA) ORAL ONCE AS NEEDED
Status: DISCONTINUED | OUTPATIENT
Start: 2020-09-21 | End: 2020-09-21 | Stop reason: HOSPADM

## 2020-09-21 RX ORDER — LANOLIN ALCOHOL/MO/W.PET/CERES
800 CREAM (GRAM) TOPICAL ONCE AS NEEDED
Status: COMPLETED | OUTPATIENT
Start: 2020-09-21 | End: 2020-09-21

## 2020-09-21 RX ORDER — ACETAMINOPHEN 325 MG/1
650 TABLET ORAL ONCE AS NEEDED
Status: DISCONTINUED | OUTPATIENT
Start: 2020-09-21 | End: 2020-09-21 | Stop reason: HOSPADM

## 2020-09-21 RX ORDER — DIPHENHYDRAMINE HCL 25 MG
25 CAPSULE ORAL ONCE AS NEEDED
Status: DISCONTINUED | OUTPATIENT
Start: 2020-09-21 | End: 2020-09-21 | Stop reason: HOSPADM

## 2020-09-21 RX ADMIN — POTASSIUM CHLORIDE 40 MEQ: 1500 TABLET, EXTENDED RELEASE ORAL at 01:09

## 2020-09-21 RX ADMIN — FILGRASTIM 780 MCG: 480 INJECTION, SOLUTION INTRAVENOUS; SUBCUTANEOUS at 09:09

## 2020-09-21 RX ADMIN — CALCIUM GLUCONATE 4000 MG: 98 INJECTION, SOLUTION INTRAVENOUS at 10:09

## 2020-09-21 RX ADMIN — MAGNESIUM OXIDE TAB 400 MG (241.3 MG ELEMENTAL MG) 800 MG: 400 (241.3 MG) TAB at 01:09

## 2020-09-21 NOTE — PROGRESS NOTES
Allo donor presented ambulatory to Apheresis / Chemo Infusion for allo stem cell collection this AM with wife.  Voiced no complaints.  Timeout performed per 2 Apheresis nurses prior to start.  Apheresis LPN accessed both arms,  peripheral ac veins. Attempted a L forearm  for a return access but the vein didn't hold up after connected to NS.  Apheresis HPC started at 0917.  Donor stated was OK this morning.  Donor oriented x4.   Donor stated had no pain, 0 on pain scale. Donor stated his joints were a little uncomfortable since his Neupogen injections started.  Meds 4 g Ca Glu IVPB during collection.  HPC ended at 1200.  Both needles removed by Apheresis LPN.  Pressure applied to  sites until hemostasis achieved.  A pressure wrap was applied to both  sites.  Post instructions given.  Donor tolerated well. Run time was 1 1/2 hours after interface achieved per Dr. Foreman.  Pt in collection room awaiting labs to be resulted.  Chemo RN will discharge donor once labs are resulted. There will be no further collections.

## 2020-09-21 NOTE — PLAN OF CARE
0830 pt here for stem cell collection(donor), hx, meds, allergies reviewed, pt with no complaints at this time, reclined in bed, apharesis nurse at bedside, continue to monitor

## 2020-09-21 NOTE — PLAN OF CARE
Pt tolerated collection withoutissue, replacements given PRN (see MAR), no distress noted upon d/c to home

## 2020-09-21 NOTE — PROCEDURES
[facility]  Transfusion Medicine  Procedure Note    SUMMARY   Stem Cell Collection Allogenic    Date/Time: 9/21/2020 1:31 PM  Performed by: Jacinto Foreman MD  Authorized by: Jacinto Foreman MD         Date of Procedure: 9/21/2020     Procedure: Hematopoietic Progenitor Cell Collection    Provider: Melanie Foreman MD     Assisting Provider: None    Pre-Procedure Diagnosis: Healthy donor of stem cells    Post-Procedure Diagnosis: Healthy donor of stem cells    Follow-up Assessment: Mr. Monge is a donor of stem cells for his sister. The pre-collection count was 182.4/uL, indicating that the goal of 3-5 million CD34 cells/kg of recipient weight could be quickly achieved.    The final count: 12.9 x 10^6 CD34 cells/kg.    Pertinent Laboratory Data:   Complete Blood Count:   Lab Results   Component Value Date    HGB 12.3 (L) 09/21/2020    HCT 38.3 (L) 09/21/2020     09/21/2020    WBC 36.35 (H) 09/21/2020     Basic Metabolic Panel:   Lab Results   Component Value Date     09/21/2020    K 3.5 09/21/2020     09/21/2020    CO2 27 09/21/2020    GLU 71 09/21/2020    BUN 21 (H) 09/21/2020    CREATININE 0.8 09/21/2020    CALCIUM 9.6 09/21/2020    ANIONGAP 11 09/21/2020    ESTGFRAFRICA >60.0 09/21/2020    EGFRNONAA >60.0 09/21/2020     CD34 - quantitative:   Lab Results   Component Value Date    CD34 0.34 09/21/2020    WM61VDVTJVNK 182.37 09/21/2020    WL55XPQQHVEC 98.4 09/21/2020       Pertinent Medications: Mobilized with GCSF, no meds contraindicated for collection    Review of patient's allergies indicates:  No Known Allergies    Anesthesia: None     Technical Procedures Used: Hematopoietic Progenitor Cell collection: The patient presented to the 5th floor Chemotherapy unit, details about the procedure reviewed. Any interim clinical changes from previous clinic visit - No. All pertinent labs reviewed. Human Progenitor (Stem) Cell Collection initiated by Apheresis Nurse. Current plan  is to perform the procedure for 2 hours after interface hours. Initial laboratory tests collected, results to Oncology Nurse. Mid-run laboratory tests collected, results to Oncology Nurse. Included CD34 count on collection bag - results to Stem Cell Lab and BMT clinical team. Final laboratory tests collected, results to Oncology Nurse. Red blood cell or platelet transfusion - No.  Date of next procedure complete.    Description of the Findings of the Procedure:     Please see Apheresis Nurse flowsheet for details.    The patient was evaluated and all clinical and laboratory data relevant to the treatment was reviewed, and a decision was made to proceed with the Apheresis procedure.    I was available to the clinical staff throughout the procedure.    Significant Surgical Tasks Conducted by the Assistant(s): Not applicable    Complications: None    Estimated Blood Loss (EBL): None    Implants: None     Specimens: None

## 2020-09-22 ENCOUNTER — TELEPHONE (OUTPATIENT)
Dept: HEMATOLOGY/ONCOLOGY | Facility: CLINIC | Age: 35
End: 2020-09-22

## 2020-09-22 NOTE — TELEPHONE ENCOUNTER
Spoke to patient.  States he doing is doing great.  Appreciated the care he received from everyone.

## 2024-08-16 NOTE — PROGRESS NOTES
"Spoke with Efe Monge, potential donor for RECIPIENT name today via telephone. Discussed the fact that his sister(s) is in need of a stem cell transplant. Efe said that he was interested in possibly being the donor for his sister(s)'s transplant. Briefly discussed the donation process. Explained to Efe that if he would match his sister(s), that his commitment to donate is very important and that he has the right to change his mind. Also explained however, that a late decision not to donate can be life threatening to the patient. Explained that he should think seriously about his commitment before he has his tissue typing drawn, and if he feels uncomfortable moving forward, that it is okay for him to say "no." Also informed Efe that if he is a perfect match for his sister(s), that we will contact him and ask if he is willing to donate. If he agrees to proceed, we will ask him about his health and schedule a physician appointment and more testing. He will receive a physical examination to be sure it is safe for him to donate and that his donation will provide the best possible outcome for the patient. We will follow medical guidelines that protect his health as a potential donor as well as the health of the transplant patient. Efe was given the opportunity to ask questions. Will mail kit to patient.  Answered all questions     "
[id number]: previous_biopsy_has_been_previously_biopsied